# Patient Record
Sex: FEMALE | Race: BLACK OR AFRICAN AMERICAN | NOT HISPANIC OR LATINO | Employment: UNEMPLOYED | URBAN - METROPOLITAN AREA
[De-identification: names, ages, dates, MRNs, and addresses within clinical notes are randomized per-mention and may not be internally consistent; named-entity substitution may affect disease eponyms.]

---

## 2019-11-15 LAB — STREP A PCR (OHS): NOT DETECTED

## 2019-11-16 ENCOUNTER — HISTORICAL (OUTPATIENT)
Dept: ADMINISTRATIVE | Facility: HOSPITAL | Age: 39
End: 2019-11-16

## 2019-11-18 ENCOUNTER — HISTORICAL (OUTPATIENT)
Dept: ADMINISTRATIVE | Facility: HOSPITAL | Age: 39
End: 2019-11-18

## 2019-11-18 LAB
T4 FREE SERPL-MCNC: 1.02 NG/DL (ref 0.76–1.46)
TSH SERPL-ACNC: 5.88 MIU/L (ref 0.36–3.74)

## 2021-05-06 ENCOUNTER — HISTORICAL (OUTPATIENT)
Dept: ADMINISTRATIVE | Facility: HOSPITAL | Age: 41
End: 2021-05-06

## 2021-08-17 ENCOUNTER — HISTORICAL (OUTPATIENT)
Dept: ADMINISTRATIVE | Facility: HOSPITAL | Age: 41
End: 2021-08-17

## 2022-04-30 NOTE — ED PROVIDER NOTES
Patient:   Issac Lundy             MRN: [id number]            FIN: [id number]               Age:   39 years     Sex:  Female     :  1980   Associated Diagnoses:   Pain in throat; Elevated TSH   Author:   Delfino Caraballo      Basic Information   Time seen: Immediately upon arrival.   History source: Patient.   Arrival mode: Private vehicle.   History limitation: None.   Additional information: Chief Complaint from Nursing Triage Note : Chief Complaint   2019 19:55 CST     Chief Complaint           throat  pain  .      History of Present Illness   The patient presents with sore throat.  The onset was 3  days ago.  The course/duration of symptoms is constant.  Location: throat. The character of symptoms is pain.  The degree at present is moderate.  The exacerbating factor is swallowing.  The relieving factor is none.  Risk factors consist of none.  Prior episodes: none.  Therapy today: none.  Associated symptoms: denies fever, denies nausea, denies vomiting, denies cough and denies difficulty swallowing.  Additional history: Patient presents to ED with complaints of throat pain x3 to 4 days.  She states it feels like she is trying to swallow a large lump when she swallows.  Denies fever, nausea, vomiting, abdominal pain, cough, congestion, chest pain, shortness of breath, headache, dizziness, weakness, fatigue, heat/cold intolerance or jitteriness..        Review of Systems   Constitutional symptoms:  Negative except as documented in HPI.   Skin symptoms:  Negative except as documented in HPI.   Eye symptoms:  Negative except as documented in HPI.   ENMT symptoms:  Negative except as documented in HPI.   Respiratory symptoms:  Negative except as documented in HPI, No shortness of breath,    Cardiovascular symptoms:  Negative except as documented in HPI, No chest pain,    Gastrointestinal symptoms:  Negative except as documented in HPI.   Genitourinary symptoms:  Negative except as documented  in HPI.   Musculoskeletal symptoms:  Negative except as documented in HPI.             Additional review of systems information: All other systems reviewed and otherwise negative.      Health Status   Allergies:    Allergic Reactions (Selected)  No Known Medication Allergies,    Allergies (1) Active Reaction  No Known Medication Allergies None Documented  .   Medications:  (Selected)   .      Past Medical/ Family/ Social History   Medical history:    No active or resolved past medical history items have been selected or recorded., Reviewed as documented in chart.   Surgical history:    No active procedure history items have been selected or recorded., Reviewed as documented in chart.   Family history:    No family history items have been selected or recorded., Reviewed as documented in chart.   Social history:    Social & Psychosocial Habits    Alcohol  05/03/2016  Use: Never    Substance Use  05/03/2016  Use: Never    Tobacco  11/18/2019  Use: Never (less than 100 in l    Patient Wants Consult For Cessation Counseling N/A    Abuse/Neglect  11/18/2019  SHX Any signs of abuse or neglect No  , Reviewed as documented in chart.   Problem list:    Active Problems (1)  Obesity   , per nurse's notes.      Physical Examination               Vital Signs   Vital Signs   11/18/2019 19:55 CST     Temperature Oral          36.5 DegC                             Temperature Oral (calculated)             97.70 DegF                             Peripheral Pulse Rate     72 bpm                             Respiratory Rate          20 br/min                             SpO2                      100 %                             Oxygen Therapy            Room air                             Systolic Blood Pressure   114 mmHg                             Diastolic Blood Pressure  74 mmHg  .      Vital Signs (last 24 hrs)_____  Last Charted___________  Temp Oral     36.5 DegC  (NOV 18 19:55)  Heart Rate Peripheral   72 bpm  (NOV 18  19:55)  Resp Rate         20 br/min  (NOV 18 19:55)  SBP      114 mmHg  (NOV 18 19:55)  DBP      74 mmHg  (NOV 18 19:55)  SpO2      100 %  (NOV 18 19:55)  .   Measurements   11/18/2019 19:55 CST     Weight Dosing             105 kg                             Weight Measured and Calculated in Lbs     231.48 lb                             Weight Estimated          105 kg                             Height/Length Dosing      167.74 cm                             Height/Length Estimated   167.74 cm                             Body Mass Index Estimated 37.32 kg/m2  .   Basic Oxygen Information   11/18/2019 19:55 CST     SpO2                      100 %                             Oxygen Therapy            Room air  .   General:  Alert, no acute distress.    Skin:  Warm, dry, intact, normal for ethnicity.    Head:  Normocephalic, atraumatic.    Neck:  Supple, trachea midline, No thyromegaly,    Eye:  Normal conjunctiva.   Ears, nose, mouth and throat:  Tympanic membranes clear, oral mucosa moist, no pharyngeal erythema or exudate.    Respiratory:  Lungs are clear to auscultation, respirations are non-labored, breath sounds are equal, Symmetrical chest wall expansion.    Cardiovascular:  Regular rate and rhythm, No murmur, Normal peripheral perfusion.    Gastrointestinal:  Soft, Nontender, Non distended, Normal bowel sounds.    Musculoskeletal:  Normal ROM, normal strength.    Neurological:  Alert and oriented to person, place, time, and situation, No focal neurological deficit observed, normal sensory observed, normal motor observed, normal speech observed.    Psychiatric:  Cooperative, appropriate mood & affect, normal judgment.       Medical Decision Making   Differential Diagnosis:  Viral pharyngitis, streptococcal pharyngitis, thyroid problem.    Documents reviewed:  Emergency department nurses' notes, emergency department records, prior records.    Results review:  Lab results : Lab View   11/18/2019 20:20 CST     T4  Free                   1.02 ng/dL                             TSH                       5.880 mIU/L  HI    ,    No qualifying data available.    Soft tissue neck CT:  No acute disease process seen.   Radiology results:  Reviewed radiologist's report, Rad Results (ST)  < 12 hrs   Accession: [id number]  Order: CT Soft Tissue Neck W/O Contrast  Report Dt/Tm: 11/18/2019 21:06  Report:   Clinical History:  Sore throat     Technique:  CT of the cervical spine Without contrast. Sagittal and coronal  reconstructions were performed on the source images.     Automatic exposure control was utilized to reduce the patient's  radiation dose.     Comparison:  No prior imaging available for comparison.     Findings:  No acute osseous abnormality. The epiglottis is thin. The visualized  sinuses are clear. The pharyngeal soft tissues are symmetric with no  evidence of swelling. The visualized thyroid is unremarkable.     Impression:  No acute abnormality. No gross inflammatory changes within the limits  of noncontrast exam.        .       Reexamination/ Reevaluation   Time: 11/18/2019 21:17:00 .   Vital signs   Basic Oxygen Information   11/18/2019 19:55 CST     SpO2                      100 %                             Oxygen Therapy            Room air     Notes: Reassessed patient at this time. Discussed with patient all pertinent ED information and results. Discussed diagnosis and treatment plan with patient. Follow up instructions and return to ED instruction have been given. All questions and concerns were addressed at this time. Patient voices understanding of information and instructions. Patient is comfortable with plan and discharge. Patient is stable for discharge..      Impression and Plan   Diagnosis   Pain in throat (EHV01-DR R07.0)   Elevated TSH (NAS71-UY R79.89)   Plan   Condition: Stable.    Disposition: Medically cleared, Discharged: Time  11/18/2019 21:17:00, to home.    Patient was given the following  educational materials: Sore Throat, Easy-to-Read, Hypothyroidism.    Follow up with: No No PCP; Report to Emergency Department if symptoms return or worsen; 1.  Family doctor/PMD Within 1 to 2 days     Take medicines as prescribed    See your family doctor in one to 2 days for further evaluation, workup, and treatment as necessary    Avoid driving or operating machinery while taking medicines as some medicines might cause drowsiness and may cause problems. Also pain medicines have potential of being addictive  so use Pain meds specially Narcotics Sparingly.    The exam and treatment you received in Emergency Room was for an urgent problem and NOT INTENDED AS COMPLETE CARE. It is important that you FOLLOW UP with a doctor for ongoing care. If your symptoms become WORSE or you DO NOT IMPROVE and you are unable to reach your health care provider, you should RETURN to the emergency department. The Emergency Room doctor has provided a PRELIMINARY INTERPRETATION of all your STUDIES. A final interpretation may be done after you are discharged. IF A CHANGE in your diagnosis or treatment is needed WE WILL CONTACT YOU. It is critical that we have a CURRENT PHONE NUMBER FOR YOU.      ; Drink plenty of fluids and stay well hydrated.; Take tylenol/ibuprofen as needed for pain.; [facility] Within 1 to 2 weeks Anytime the conditions worsen, return to clinic or go to the ED.; Follow-up with endocrinology within 1 month..    Counseled: Patient, Regarding diagnosis, Regarding diagnostic results, Regarding treatment plan, Regarding prescription, Patient indicated understanding of instructions.    Orders: Launch Orders   Admit/Transfer/Discharge:  Discharge (Order): 11/18/2019 21:21 CST, Home  .    Notes: Endocrinology referral. .

## 2024-08-21 DIAGNOSIS — M65.4 DE QUERVAIN'S TENOSYNOVITIS: Primary | ICD-10-CM
